# Patient Record
Sex: FEMALE | Race: WHITE | URBAN - METROPOLITAN AREA
[De-identification: names, ages, dates, MRNs, and addresses within clinical notes are randomized per-mention and may not be internally consistent; named-entity substitution may affect disease eponyms.]

---

## 2022-05-25 ENCOUNTER — TELEPHONE (OUTPATIENT)
Dept: OBGYN CLINIC | Age: 37
End: 2022-05-25

## 2022-05-25 NOTE — TELEPHONE ENCOUNTER
----- Message from Dennys Walton MD sent at 5/24/2022  4:05 PM EDT -----  She needs set up for colpo for last pap LGSIL and prior Colpo showing no dysplasia

## 2022-06-03 ENCOUNTER — OFFICE VISIT (OUTPATIENT)
Dept: OBGYN CLINIC | Age: 37
End: 2022-06-03
Payer: COMMERCIAL

## 2022-06-03 VITALS
SYSTOLIC BLOOD PRESSURE: 110 MMHG | HEIGHT: 70 IN | DIASTOLIC BLOOD PRESSURE: 64 MMHG | BODY MASS INDEX: 23.74 KG/M2 | WEIGHT: 165.8 LBS

## 2022-06-03 DIAGNOSIS — N83.201 CYST OF RIGHT OVARY: ICD-10-CM

## 2022-06-03 DIAGNOSIS — N92.6 IRREGULAR MENSTRUAL CYCLE: Primary | ICD-10-CM

## 2022-06-03 PROCEDURE — 76830 TRANSVAGINAL US NON-OB: CPT | Performed by: OBSTETRICS & GYNECOLOGY

## 2022-06-03 PROCEDURE — 99213 OFFICE O/P EST LOW 20 MIN: CPT | Performed by: OBSTETRICS & GYNECOLOGY

## 2022-06-03 ASSESSMENT — PATIENT HEALTH QUESTIONNAIRE - PHQ9
1. LITTLE INTEREST OR PLEASURE IN DOING THINGS: 0
SUM OF ALL RESPONSES TO PHQ QUESTIONS 1-9: 0
SUM OF ALL RESPONSES TO PHQ9 QUESTIONS 1 & 2: 0
2. FEELING DOWN, DEPRESSED OR HOPELESS: 0
SUM OF ALL RESPONSES TO PHQ QUESTIONS 1-9: 0

## 2022-06-03 NOTE — PROGRESS NOTES
Michelle Brown  is a 30 y.o. female, No obstetric history on file., Patient's last menstrual period was 06/03/2022.,  who is seen for an ultrasound for irregular periods. HISTORY:    No current outpatient medications on file prior to visit. No current facility-administered medications on file prior to visit. ROS:  [unfilled]      Michelle Brown  has a past medical history of Abnormal Papanicolaou smear of cervix and HPV in female. .    Previous surgeries include  has a past surgical history that includes Finger amputation (Right); LEEP; and Colposcopy. .    Her current meds are No current outpatient medications on file. Family history is significant for family history includes Breast Cancer (age of onset: 67) in her maternal grandmother; Breast Cancer (age of onset: 80) in her paternal grandmother; Celiac Disease in her mother; Other in her sister. Scottie Smith PHYSICAL EXAM:  Blood pressure 110/64, height 5' 10\" (1.778 m), weight 165 lb 12.8 oz (75.2 kg), last menstrual period 06/03/2022. OBGyn Exam   The patient appears well, alert, oriented x 3, in no distress. Today shows uterus 150 mL volume overall normal endometrium is normal right ovary is enlarged with a septated cyst of 3.6 x 3.6 x 3.1 cm. Left ovary is normal no other abnormalities are seen. Please see the full report under imaging. ASSESSMENT:  Encounter Diagnoses   Name Primary?  Irregular menstrual cycle Yes    Cyst of right ovary        PLAN:  Discussed the ultrasound with her as far as her abnormal uterine bleeding I did not see an anatomic cause and she is reassured with this she feels comfortable with going pattern that she is having does not wish any treatment. Discussed this right ovarian cyst most likely this is physiologic but is not a completely simple cyst is not less than 3 cm and therefore does not need further evaluation. At this point I think we can do further evaluation as a repeat ultrasound in approximately 6 weeks.   If it is persistent or enlarging we may need to consider further therapy but most likely this will resolve if she has any discomfort she should use over-the-counter medications. Orders Placed This Encounter   Procedures    AMB POC US, TRANSVAGINAL     Order Specific Question:   Reason for Exam:     Answer:   GYN US     Order Specific Question:   Are you Pregnant? Answer:    No

## 2022-06-14 ENCOUNTER — CLINICAL DOCUMENTATION (OUTPATIENT)
Dept: OBGYN | Age: 37
End: 2022-06-14

## 2022-06-15 ENCOUNTER — PROCEDURE VISIT (OUTPATIENT)
Dept: OBGYN CLINIC | Age: 37
End: 2022-06-15
Payer: COMMERCIAL

## 2022-06-15 VITALS
HEIGHT: 70 IN | SYSTOLIC BLOOD PRESSURE: 118 MMHG | WEIGHT: 163 LBS | BODY MASS INDEX: 23.34 KG/M2 | DIASTOLIC BLOOD PRESSURE: 80 MMHG

## 2022-06-15 DIAGNOSIS — R87.612 LGSIL ON PAP SMEAR OF CERVIX: Primary | ICD-10-CM

## 2022-06-15 DIAGNOSIS — Z01.812 PRE-PROCEDURE LAB EXAM: ICD-10-CM

## 2022-06-15 PROCEDURE — 57452 EXAM OF CERVIX W/SCOPE: CPT | Performed by: OBSTETRICS & GYNECOLOGY

## 2022-06-15 ASSESSMENT — PATIENT HEALTH QUESTIONNAIRE - PHQ9
1. LITTLE INTEREST OR PLEASURE IN DOING THINGS: 0
SUM OF ALL RESPONSES TO PHQ QUESTIONS 1-9: 0
SUM OF ALL RESPONSES TO PHQ QUESTIONS 1-9: 0
SUM OF ALL RESPONSES TO PHQ9 QUESTIONS 1 & 2: 0
SUM OF ALL RESPONSES TO PHQ QUESTIONS 1-9: 0
SUM OF ALL RESPONSES TO PHQ QUESTIONS 1-9: 0
2. FEELING DOWN, DEPRESSED OR HOPELESS: 0

## 2022-06-15 NOTE — PROGRESS NOTES
Colposcopy Exam          Indication for Colposcopy:  LGSIL, HPV +, negative for 70,45,66    Contraceptive method:  vasectomy    LMP:  Patient's last menstrual period was 2022 (within days). Tobacco Use:    Social History     Tobacco Use   Smoking Status Never Smoker   Smokeless Tobacco Never Used     Medications:    No current outpatient medications on file prior to visit. No current facility-administered medications on file prior to visit. Allergies: Allergies as of 06/15/2022    (No Known Allergies)       uHCG:  negative    Abnormal Pap and Colposcopy History:  LSGIL, hpv positive, hx of LEEP. HPV testing:  negative    Procedure: The patient is counseled regarding the risks of coloposcopy, including bleeding, infection at the biopsy site or endometrium, and failure to identify the lesion. Patient was placed in the lithotomy position. The vulva was examined for suspicious lesions. Subsequently a speculum was inserted and the cervix and vaginal wall were visualized. 3% Acetic acid was applied to the cervix using cotton swabs and the area was further visualized. Abnormal areas were not identified  and biopsies were not obtained.        Adequate Procedure: Yes    ECC Performed No    Additional lesion identified: No     Summary:  normal exam without visible pathology    Plan:  Repap in 6 months

## 2023-02-08 ENCOUNTER — CLINICAL DOCUMENTATION (OUTPATIENT)
Dept: OBGYN CLINIC | Age: 38
End: 2023-02-08

## 2023-02-08 NOTE — PROGRESS NOTES
Record release faxed to [facility], records going to [address], [place].